# Patient Record
Sex: MALE | Race: OTHER | NOT HISPANIC OR LATINO | URBAN - METROPOLITAN AREA
[De-identification: names, ages, dates, MRNs, and addresses within clinical notes are randomized per-mention and may not be internally consistent; named-entity substitution may affect disease eponyms.]

---

## 2017-05-29 ENCOUNTER — EMERGENCY (EMERGENCY)
Facility: HOSPITAL | Age: 57
LOS: 0 days | Discharge: HOME | End: 2017-05-30

## 2017-06-28 DIAGNOSIS — R20.0 ANESTHESIA OF SKIN: ICD-10-CM

## 2017-06-28 DIAGNOSIS — G89.29 OTHER CHRONIC PAIN: ICD-10-CM

## 2017-06-28 DIAGNOSIS — E11.9 TYPE 2 DIABETES MELLITUS WITHOUT COMPLICATIONS: ICD-10-CM

## 2017-06-28 DIAGNOSIS — Z79.82 LONG TERM (CURRENT) USE OF ASPIRIN: ICD-10-CM
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
## 2017-06-28 DIAGNOSIS — R51 HEADACHE: ICD-10-CM
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
## 2017-06-28 DIAGNOSIS — M54.5 LOW BACK PAIN: ICD-10-CM

## 2017-06-28 DIAGNOSIS — S40.811A ABRASION OF RIGHT UPPER ARM, INITIAL ENCOUNTER: ICD-10-CM

## 2017-06-28 DIAGNOSIS — M54.2 CERVICALGIA: ICD-10-CM

## 2017-06-28 DIAGNOSIS — E78.00 PURE HYPERCHOLESTEROLEMIA, UNSPECIFIED: ICD-10-CM

## 2017-08-22 ENCOUNTER — OUTPATIENT (OUTPATIENT)
Dept: OUTPATIENT SERVICES | Facility: HOSPITAL | Age: 57
LOS: 1 days | Discharge: HOME | End: 2017-08-22

## 2017-08-22 DIAGNOSIS — K04.7 PERIAPICAL ABSCESS WITHOUT SINUS: ICD-10-CM

## 2017-09-05 ENCOUNTER — EMERGENCY (EMERGENCY)
Facility: HOSPITAL | Age: 57
LOS: 0 days | Discharge: HOME | End: 2017-09-05
Admitting: FAMILY MEDICINE

## 2017-09-05 DIAGNOSIS — Z79.84 LONG TERM (CURRENT) USE OF ORAL HYPOGLYCEMIC DRUGS: ICD-10-CM

## 2017-09-05 DIAGNOSIS — R07.89 OTHER CHEST PAIN: ICD-10-CM
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
## 2017-09-05 DIAGNOSIS — Z88.1 ALLERGY STATUS TO OTHER ANTIBIOTIC AGENTS STATUS: ICD-10-CM

## 2017-09-05 DIAGNOSIS — E11.9 TYPE 2 DIABETES MELLITUS WITHOUT COMPLICATIONS: ICD-10-CM

## 2017-09-05 DIAGNOSIS — E78.00 PURE HYPERCHOLESTEROLEMIA, UNSPECIFIED: ICD-10-CM

## 2017-09-05 DIAGNOSIS — E78.5 HYPERLIPIDEMIA, UNSPECIFIED: ICD-10-CM

## 2017-09-05 DIAGNOSIS — I10 ESSENTIAL (PRIMARY) HYPERTENSION: ICD-10-CM

## 2017-09-05 DIAGNOSIS — Z79.899 OTHER LONG TERM (CURRENT) DRUG THERAPY: ICD-10-CM

## 2017-09-05 DIAGNOSIS — Z79.82 LONG TERM (CURRENT) USE OF ASPIRIN: ICD-10-CM
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
## 2022-06-22 PROBLEM — Z00.00 ENCOUNTER FOR PREVENTIVE HEALTH EXAMINATION: Status: ACTIVE | Noted: 2022-06-22

## 2022-06-23 ENCOUNTER — APPOINTMENT (OUTPATIENT)
Dept: PAIN MANAGEMENT | Facility: CLINIC | Age: 62
End: 2022-06-23

## 2022-06-23 ENCOUNTER — APPOINTMENT (OUTPATIENT)
Dept: NEUROLOGY | Facility: CLINIC | Age: 62
End: 2022-06-23

## 2022-06-23 VITALS
WEIGHT: 185 LBS | DIASTOLIC BLOOD PRESSURE: 78 MMHG | HEIGHT: 71 IN | SYSTOLIC BLOOD PRESSURE: 132 MMHG | BODY MASS INDEX: 25.9 KG/M2 | HEART RATE: 75 BPM

## 2022-06-23 DIAGNOSIS — Z87.891 PERSONAL HISTORY OF NICOTINE DEPENDENCE: ICD-10-CM

## 2022-06-23 DIAGNOSIS — Z86.39 PERSONAL HISTORY OF OTHER ENDOCRINE, NUTRITIONAL AND METABOLIC DISEASE: ICD-10-CM

## 2022-06-23 DIAGNOSIS — Z78.9 OTHER SPECIFIED HEALTH STATUS: ICD-10-CM

## 2022-06-23 PROCEDURE — 99213 OFFICE O/P EST LOW 20 MIN: CPT

## 2022-06-23 NOTE — ASSESSMENT
[FreeTextEntry1] : Mr. Ramirez suffers with chronic lumbar pain with right leg radiculopathy. He denies any new issues or concerns. He continues to remain stable on a regimen of Percocet, ibuprofen, and amitriptyline which provides him with approximately 50-60% relief. He denies any adverse side effects and shows no aberrant drug behavior. His Percocet was refilled for him today. He will follow up in 4 weeks for routine care.

## 2022-06-23 NOTE — HISTORY OF PRESENT ILLNESS
[FreeTextEntry1] : HISTORY OF PRESENT ILLNESS, ORIGINAL PRESENTATION: Mr. Ramirez is a 62-year-old gentleman who presents with a 20-year history of low back pain. He was seen by Neurosurgery in 2013. They had recommended possible surgical intervention; however, at that time, he was not interested. As mentioned above, his pain radiates down the right leg to the foot with paresthesias and dysesthesias. He describes it as moderate-to-severe, nearly constant, 60% to 95% percent of the time. No particular pattern. He describes the pain as shooting, sharp, cutting with numbness in the lower extremities. He draws a corresponding pain diagram. He states that standing, sitting, walking, exercise, coughing, sneezing all increase his pain. Lying down and relaxing decrease his pain. He denies any recent change in bowel or bladder habits.\par \par ACTIVITIES: He states he can walk several blocks depending on how severe his pain is. Depending on how severe pain is, he does not know how long he could sit or stand for. In a day, he often lies down because of the pain. He does not use an assistive device to ambulate. Because of his pain, he has difficulty going to work, \par \par TODAY: He presents today for a revisit appointment. He continues to suffer with chronic back pain and right leg radiculopathy. His symptoms remain unchanged. He typically notes an increase in pain due to the cold / damp weather.\par \par He continues to be medically managed on ibuprofen twice a day PRN, Percocet 7.5/325 mg twice daily as needed as well as amitriptyline nightly. He states the combination of medications help him function during the day and sleep better at night. Overall, he states the medications provide him with approximately 60-65% relief. He denies any adverse side effects.\par \par UDS: repeated today, 6/23/22 – see separate note.

## 2022-06-23 NOTE — DISCUSSION/SUMMARY
[de-identified] : I have consulted the  registry for the purpose of reviewing the patient's controlled substance.\par \par Overall there is at least a 30-50% reduction in pain with the prescribed analgesics. The patient denies any adverse side effects due to the medication (sleeping disturbance, constipation, sleepiness, hallucinations and/or urination problems). \par \par Urine drug screening collected today with rapid sample result consistent with given regimen. Sample to be sent for confirmatory testing with additional relief at a later time.\par \par The patient will return to the office in 4 weeks time and is aware to contact me with any question or concerns in the interim.\par \par Silvia Craven PA-C\par Chapincito Drummond DO\par \par

## 2022-08-23 ENCOUNTER — APPOINTMENT (OUTPATIENT)
Dept: PAIN MANAGEMENT | Facility: CLINIC | Age: 62
End: 2022-08-23

## 2022-09-26 ENCOUNTER — LABORATORY RESULT (OUTPATIENT)
Age: 62
End: 2022-09-26

## 2022-09-27 ENCOUNTER — APPOINTMENT (OUTPATIENT)
Dept: PAIN MANAGEMENT | Facility: CLINIC | Age: 62
End: 2022-09-27

## 2022-09-27 VITALS
HEART RATE: 106 BPM | SYSTOLIC BLOOD PRESSURE: 125 MMHG | DIASTOLIC BLOOD PRESSURE: 78 MMHG | WEIGHT: 185 LBS | HEIGHT: 71 IN | BODY MASS INDEX: 25.9 KG/M2

## 2022-09-27 LAB — AMPHET UR-MCNC: NEGATIVE

## 2022-09-27 PROCEDURE — 99213 OFFICE O/P EST LOW 20 MIN: CPT

## 2022-09-27 PROCEDURE — 80305 DRUG TEST PRSMV DIR OPT OBS: CPT | Mod: QW

## 2022-09-27 NOTE — ASSESSMENT
[FreeTextEntry1] : Mr. Ramirez suffers with chronic lumbar pain with right leg radiculopathy. He denies any new issues or concerns. He continues to remain stable on a regimen of Percocet, ibuprofen, and amitriptyline which provides him with approximately 50-60% relief. He denies any adverse side effects and shows no aberrant drug behavior. His Percocet was refilled for him today. He will call for medication refills next month. Patient will follow up in 8 weeks for routine care.

## 2022-09-27 NOTE — HISTORY OF PRESENT ILLNESS
[FreeTextEntry1] : HISTORY OF PRESENT ILLNESS, ORIGINAL PRESENTATION: Mr. Ramirez is a 62-year-old gentleman who presents with a 20-year history of low back pain. He was seen by Neurosurgery in 2013. They had recommended possible surgical intervention; however, at that time, he was not interested. As mentioned above, his pain radiates down the right leg to the foot with paresthesias and dysesthesias. He describes it as moderate-to-severe, nearly constant, 60% to 95% percent of the time. No particular pattern. He describes the pain as shooting, sharp, cutting with numbness in the lower extremities. He draws a corresponding pain diagram. He states that standing, sitting, walking, exercise, coughing, sneezing all increase his pain. Lying down and relaxing decrease his pain. He denies any recent change in bowel or bladder habits.\par \par ACTIVITIES: He states he can walk several blocks depending on how severe his pain is. Depending on how severe pain is, he does not know how long he could sit or stand for. In a day, he often lies down because of the pain. He does not use an assistive device to ambulate. Because of his pain, he has difficulty going to work, \par \par TODAY: He presents today for a revisit appointment. He continues to suffer with chronic back pain and right leg radiculopathy. His symptoms remain unchanged. He typically notes an increase in pain due to the cold / damp weather.\par \par He continues to be medically managed on ibuprofen twice a day PRN, Percocet 7.5/325 mg twice daily as needed as well as amitriptyline nightly. He states the combination of medications help him function during the day and sleep better at night. Overall, he states the medications provide him with approximately 60-65% relief. He denies any adverse side effects.\par \par UDS, 06/2022 - Consistent\par Repeated today, 9/27/22 - see separate note.

## 2022-09-27 NOTE — DISCUSSION/SUMMARY
[de-identified] : I have consulted the  registry for the purpose of reviewing the patient's controlled substance.\par \par Overall there is at least a 30-50% reduction in pain with the prescribed analgesics. The patient denies any adverse side effects due to the medication (sleeping disturbance, constipation, sleepiness, hallucinations and/or urination problems). \par \par Urine drug screening collected today with rapid sample result consistent with given regimen. Sample to be sent for confirmatory testing with additional relief at a later time.\par \par The patient will return to the office in 4 weeks time and is aware to contact me with any question or concerns in the interim.\par \par Silvia Craven PA-C\par Chapincito Drummond DO\par \par

## 2022-11-01 ENCOUNTER — APPOINTMENT (OUTPATIENT)
Dept: PAIN MANAGEMENT | Facility: CLINIC | Age: 62
End: 2022-11-01

## 2022-11-03 ENCOUNTER — APPOINTMENT (OUTPATIENT)
Dept: PAIN MANAGEMENT | Facility: CLINIC | Age: 62
End: 2022-11-03

## 2022-12-02 ENCOUNTER — APPOINTMENT (OUTPATIENT)
Dept: PAIN MANAGEMENT | Facility: CLINIC | Age: 62
End: 2022-12-02

## 2022-12-02 VITALS
BODY MASS INDEX: 25.9 KG/M2 | SYSTOLIC BLOOD PRESSURE: 123 MMHG | HEIGHT: 71 IN | HEART RATE: 79 BPM | DIASTOLIC BLOOD PRESSURE: 80 MMHG | WEIGHT: 185 LBS

## 2022-12-02 PROCEDURE — 99213 OFFICE O/P EST LOW 20 MIN: CPT

## 2022-12-02 NOTE — HISTORY OF PRESENT ILLNESS
[FreeTextEntry1] : HISTORY OF PRESENT ILLNESS, ORIGINAL PRESENTATION: Mr. Ramirez is a 62-year-old gentleman who presents with a 20-year history of low back pain. He was seen by Neurosurgery in 2013. They had recommended possible surgical intervention; however, at that time, he was not interested. As mentioned above, his pain radiates down the right leg to the foot with paresthesias and dysesthesias. He describes it as moderate-to-severe, nearly constant, 60% to 95% percent of the time. No particular pattern. He describes the pain as shooting, sharp, cutting with numbness in the lower extremities. He draws a corresponding pain diagram. He states that standing, sitting, walking, exercise, coughing, sneezing all increase his pain. Lying down and relaxing decrease his pain. He denies any recent change in bowel or bladder habits.\par \par ACTIVITIES: He states he can walk several blocks depending on how severe his pain is. Depending on how severe pain is, he does not know how long he could sit or stand for. In a day, he often lies down because of the pain. He does not use an assistive device to ambulate. Because of his pain, he has difficulty going to work, \par \par TODAY: I had the pleasure of seeing Mr. Ramirez today in follow up.  His previous history and physical findings have been reviewed.\par \par He is under our care for chronic lumbar pain with radiculopathy which he is receiving continuing active treatment for.  He states nothing has changed over the past several weeks with respect to his condition.  He remains stable on a regimen of ibuprofen twice a day PRN, Percocet 7.5/325 mg twice daily as needed as well as amitriptyline nightly. He states the combination of medications help him function during the day and sleep better at night. He states the cold weather increases his pain but overall states the medications provide him with approximately 60-65% relief without side effects.  Previous UDS testing was consistent and we will therefore continue as is without change.\par

## 2022-12-02 NOTE — ASSESSMENT
[FreeTextEntry1] : 62 year old male with chronic lumbar pain with radiculopathy.  We will continue to prescribe the above medication as mentioned and f/u in 4-8 weeks for re evaluation.  He is aware if there are any issues he will contact the office.\par \par I personally reviewed with the PA, this patient's history and physical exam findings, as documented above. I have discussed the relevant areas of concern, having direct implications to the presenting problems and illnesses, and I have personally examined all pertinent and positive and negative findings, which impact on the prior pain management treatment. \par \par \par Check of the  registry reveals compliance in regards to narcotic medication management use\par \par \par Kaitlin Reynolds, MS, PA-C\par Chapincito Drummond, \par

## 2022-12-06 ENCOUNTER — NON-APPOINTMENT (OUTPATIENT)
Age: 62
End: 2022-12-06

## 2023-01-12 ENCOUNTER — LABORATORY RESULT (OUTPATIENT)
Age: 63
End: 2023-01-12

## 2023-01-12 ENCOUNTER — APPOINTMENT (OUTPATIENT)
Dept: PAIN MANAGEMENT | Facility: CLINIC | Age: 63
End: 2023-01-12
Payer: MEDICARE

## 2023-01-12 VITALS
HEART RATE: 82 BPM | SYSTOLIC BLOOD PRESSURE: 127 MMHG | HEIGHT: 71 IN | WEIGHT: 185 LBS | DIASTOLIC BLOOD PRESSURE: 76 MMHG | BODY MASS INDEX: 25.9 KG/M2

## 2023-01-12 LAB — PM MDA: NORMAL

## 2023-01-12 PROCEDURE — 99213 OFFICE O/P EST LOW 20 MIN: CPT

## 2023-01-12 PROCEDURE — 80305 DRUG TEST PRSMV DIR OPT OBS: CPT | Mod: QW

## 2023-01-12 NOTE — ASSESSMENT
[FreeTextEntry1] : 62 year old male with chronic lumbar pain with radiculopathy. His symptoms remain stable. He will continue with his medication regimen as is without change at this time. He will f/u in 4 weeks for reevaluation. He is aware if there are any issues he will contact the office.\par

## 2023-01-12 NOTE — DISCUSSION/SUMMARY
[de-identified] : I have consulted the  registry for the purpose of reviewing the patient's controlled substance.\par \par Overall there is at least a 30-50% reduction in pain with the prescribed analgesics. The patient denies any adverse side effects due to the medication (sleeping disturbance, constipation, sleepiness, hallucinations and/or urination problems). \par \par Urine drug screening collected today with rapid sample result consistent with given regimen. Sample to be sent for confirmatory testing with additional relief at a later time.\par \par The patient will return to the office in 4 weeks time and is aware to contact me with any question or concerns in the interim.\par \par Silvia Craven PA-C\par Chapincito Drummond DO\par

## 2023-01-12 NOTE — HISTORY OF PRESENT ILLNESS
[FreeTextEntry1] : HISTORY OF PRESENT ILLNESS, ORIGINAL PRESENTATION: Mr. Ramirez is a 62-year-old gentleman who presents with a 20-year history of low back pain. He was seen by Neurosurgery in 2013. They had recommended possible surgical intervention; however, at that time, he was not interested. As mentioned above, his pain radiates down the right leg to the foot with paresthesias and dysesthesias. He describes it as moderate-to-severe, nearly constant, 60% to 95% percent of the time. No particular pattern. He describes the pain as shooting, sharp, cutting with numbness in the lower extremities. He draws a corresponding pain diagram. He states that standing, sitting, walking, exercise, coughing, sneezing all increase his pain. Lying down and relaxing decrease his pain. He denies any recent change in bowel or bladder habits.\par \par ACTIVITIES: He states he can walk several blocks depending on how severe his pain is. Depending on how severe pain is, he does not know how long he could sit or stand for. In a day, he often lies down because of the pain. He does not use an assistive device to ambulate. Because of his pain, he has difficulty going to work, \par \par TODAY: I had the pleasure of seeing Mr. Ramirez today in follow up.  His previous history and physical findings have been reviewed.\par \par He is under our care for chronic lumbar pain with radiculopathy which he is receiving continuing active treatment for. His symptoms remain unchanged. He remains stable on a regimen of ibuprofen twice a day PRN, Percocet 7.5/325 mg twice daily as needed as well as amitriptyline nightly. He states the combination of medications help him function during the day and sleep better at night. He states the cold weather increases his pain but overall states the medications provide him with approximately 60-65% relief without side effects. \par \par UDS: repeated today, 1/12/23 - see separate note.\par SOAPP, done today, 1/12/23 - HIGH  RISK.\par \par

## 2023-02-02 ENCOUNTER — APPOINTMENT (OUTPATIENT)
Dept: PAIN MANAGEMENT | Facility: CLINIC | Age: 63
End: 2023-02-02
Payer: MEDICARE

## 2023-02-02 VITALS
HEIGHT: 71 IN | SYSTOLIC BLOOD PRESSURE: 117 MMHG | BODY MASS INDEX: 25.9 KG/M2 | WEIGHT: 185 LBS | DIASTOLIC BLOOD PRESSURE: 76 MMHG | HEART RATE: 74 BPM

## 2023-02-02 PROCEDURE — 99213 OFFICE O/P EST LOW 20 MIN: CPT

## 2023-02-02 NOTE — HISTORY OF PRESENT ILLNESS
[FreeTextEntry1] : HISTORY OF PRESENT ILLNESS, ORIGINAL PRESENTATION: Mr. Ramirez is a 62-year-old gentleman who presents with a 20-year history of low back pain. He was seen by Neurosurgery in 2013. They had recommended possible surgical intervention; however, at that time, he was not interested. As mentioned above, his pain radiates down the right leg to the foot with paresthesias and dysesthesias. He describes it as moderate-to-severe, nearly constant, 60% to 95% percent of the time. No particular pattern. He describes the pain as shooting, sharp, cutting with numbness in the lower extremities. He draws a corresponding pain diagram. He states that standing, sitting, walking, exercise, coughing, sneezing all increase his pain. Lying down and relaxing decrease his pain. He denies any recent change in bowel or bladder habits.\par \par ACTIVITIES: He states he can walk several blocks depending on how severe his pain is. Depending on how severe pain is, he does not know how long he could sit or stand for. In a day, he often lies down because of the pain. He does not use an assistive device to ambulate. Because of his pain, he has difficulty going to work, \par \par TODAY: I had the pleasure of seeing Mr. Ramirez today in follow up.  His previous history and physical findings have been reviewed.\par \par He is under our care for chronic lumbar pain with radiculopathy which he is receiving continuing active treatment for. He reports his right leg pain has been bothersome for him. He has trialed PT and injections in the past, which were ineffective for him. He does not wish to re-trial them at this time.\par \par He remains stable on a regimen of ibuprofen twice a day PRN, Percocet 7.5/325 mg twice daily as needed as well as amitriptyline nightly. He states the combination of medications help him function during the day and sleep better at night. He denies side effects.  We discussed increasing his amitriptyline to 2 tabs at nighttime, which he is agreeable with.\par \par UDS: 1/12/23.\par SOAPP 1/12/23 - HIGH  RISK.\par \par

## 2023-02-02 NOTE — DISCUSSION/SUMMARY
[de-identified] : I have consulted the  registry for the purpose of reviewing the patient's controlled substance.\par \par Overall there is at least a 30-50% reduction in pain with the prescribed analgesics. The patient denies any adverse side effects due to the medication (sleeping disturbance, constipation, sleepiness, hallucinations and/or urination problems). \par \par The patient will return to the office in 4 weeks time and is aware to contact me with any question or concerns in the interim.\par \par Silvia Craven PA-C\par Chapincito Drummond DO\par

## 2023-02-02 NOTE — ASSESSMENT
[FreeTextEntry1] : 62 year old male with chronic lumbar pain with radiculopathy. He will increase his amitriptyline to 2 tabs at nighttime. He will f/u in 4 weeks for reevaluation. He is aware if there are any issues he will contact the office.\par

## 2023-02-27 ENCOUNTER — EMERGENCY (EMERGENCY)
Facility: HOSPITAL | Age: 63
LOS: 0 days | Discharge: HOME | End: 2023-02-27
Attending: EMERGENCY MEDICINE
Payer: MEDICARE

## 2023-02-27 VITALS
DIASTOLIC BLOOD PRESSURE: 71 MMHG | OXYGEN SATURATION: 100 % | RESPIRATION RATE: 19 BRPM | TEMPERATURE: 98 F | SYSTOLIC BLOOD PRESSURE: 129 MMHG | HEART RATE: 88 BPM

## 2023-02-27 VITALS
DIASTOLIC BLOOD PRESSURE: 77 MMHG | OXYGEN SATURATION: 99 % | HEART RATE: 90 BPM | SYSTOLIC BLOOD PRESSURE: 145 MMHG | RESPIRATION RATE: 18 BRPM | WEIGHT: 149.91 LBS | TEMPERATURE: 98 F

## 2023-02-27 DIAGNOSIS — Z88.1 ALLERGY STATUS TO OTHER ANTIBIOTIC AGENTS STATUS: ICD-10-CM

## 2023-02-27 DIAGNOSIS — Z29.14 ENCOUNTER FOR PROPHYLACTIC RABIES IMMUNE GLOBIN: ICD-10-CM
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
## 2023-02-27 DIAGNOSIS — T38.3X6A UNDERDOSING OF INSULIN AND ORAL HYPOGLYCEMIC [ANTIDIABETIC] DRUGS, INITIAL ENCOUNTER: ICD-10-CM

## 2023-02-27 DIAGNOSIS — Z79.84 LONG TERM (CURRENT) USE OF ORAL HYPOGLYCEMIC DRUGS: ICD-10-CM
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
## 2023-02-27 DIAGNOSIS — R42 DIZZINESS AND GIDDINESS: ICD-10-CM

## 2023-02-27 DIAGNOSIS — E11.65 TYPE 2 DIABETES MELLITUS WITH HYPERGLYCEMIA: ICD-10-CM

## 2023-02-27 LAB
ALBUMIN SERPL ELPH-MCNC: 4.1 G/DL — SIGNIFICANT CHANGE UP (ref 3.5–5.2)
ALP SERPL-CCNC: 91 U/L — SIGNIFICANT CHANGE UP (ref 30–115)
ALT FLD-CCNC: 24 U/L — SIGNIFICANT CHANGE UP (ref 0–41)
ANION GAP SERPL CALC-SCNC: 12 MMOL/L — SIGNIFICANT CHANGE UP (ref 7–14)
APPEARANCE UR: CLEAR — SIGNIFICANT CHANGE UP
AST SERPL-CCNC: 17 U/L — SIGNIFICANT CHANGE UP (ref 0–41)
B-OH-BUTYR SERPL-SCNC: <0.2 MMOL/L — SIGNIFICANT CHANGE UP
BASE EXCESS BLDV CALC-SCNC: 4.6 MMOL/L — HIGH (ref -2–3)
BASOPHILS # BLD AUTO: 0.13 K/UL — SIGNIFICANT CHANGE UP (ref 0–0.2)
BASOPHILS NFR BLD AUTO: 1.7 % — HIGH (ref 0–1)
BILIRUB SERPL-MCNC: 0.3 MG/DL — SIGNIFICANT CHANGE UP (ref 0.2–1.2)
BILIRUB UR-MCNC: NEGATIVE — SIGNIFICANT CHANGE UP
BUN SERPL-MCNC: 18 MG/DL — SIGNIFICANT CHANGE UP (ref 10–20)
CA-I SERPL-SCNC: 1.22 MMOL/L — SIGNIFICANT CHANGE UP (ref 1.15–1.33)
CALCIUM SERPL-MCNC: 9.7 MG/DL — SIGNIFICANT CHANGE UP (ref 8.4–10.5)
CHLORIDE SERPL-SCNC: 97 MMOL/L — LOW (ref 98–110)
CO2 SERPL-SCNC: 25 MMOL/L — SIGNIFICANT CHANGE UP (ref 17–32)
COLOR SPEC: YELLOW — SIGNIFICANT CHANGE UP
CREAT SERPL-MCNC: 0.7 MG/DL — SIGNIFICANT CHANGE UP (ref 0.7–1.5)
DIFF PNL FLD: NEGATIVE — SIGNIFICANT CHANGE UP
EGFR: 104 ML/MIN/1.73M2 — SIGNIFICANT CHANGE UP
EOSINOPHIL # BLD AUTO: 0.34 K/UL — SIGNIFICANT CHANGE UP (ref 0–0.7)
EOSINOPHIL NFR BLD AUTO: 4.6 % — SIGNIFICANT CHANGE UP (ref 0–8)
GAS PNL BLDV: 133 MMOL/L — LOW (ref 136–145)
GAS PNL BLDV: SIGNIFICANT CHANGE UP
GLUCOSE SERPL-MCNC: 157 MG/DL — HIGH (ref 70–99)
GLUCOSE UR QL: 250 MG/DL
HCO3 BLDV-SCNC: 29 MMOL/L — SIGNIFICANT CHANGE UP (ref 22–29)
HCT VFR BLD CALC: 47 % — SIGNIFICANT CHANGE UP (ref 42–52)
HCT VFR BLDA CALC: 47 % — SIGNIFICANT CHANGE UP (ref 39–51)
HGB BLD CALC-MCNC: 15.8 G/DL — SIGNIFICANT CHANGE UP (ref 12.6–17.4)
HGB BLD-MCNC: 16 G/DL — SIGNIFICANT CHANGE UP (ref 14–18)
IMM GRANULOCYTES NFR BLD AUTO: 0.5 % — HIGH (ref 0.1–0.3)
KETONES UR-MCNC: NEGATIVE — SIGNIFICANT CHANGE UP
LACTATE BLDV-MCNC: 1.4 MMOL/L — SIGNIFICANT CHANGE UP (ref 0.5–2)
LEUKOCYTE ESTERASE UR-ACNC: NEGATIVE — SIGNIFICANT CHANGE UP
LYMPHOCYTES # BLD AUTO: 3.19 K/UL — SIGNIFICANT CHANGE UP (ref 1.2–3.4)
LYMPHOCYTES # BLD AUTO: 42.9 % — SIGNIFICANT CHANGE UP (ref 20.5–51.1)
MCHC RBC-ENTMCNC: 29.2 PG — SIGNIFICANT CHANGE UP (ref 27–31)
MCHC RBC-ENTMCNC: 34 G/DL — SIGNIFICANT CHANGE UP (ref 32–37)
MCV RBC AUTO: 85.8 FL — SIGNIFICANT CHANGE UP (ref 80–94)
MONOCYTES # BLD AUTO: 0.51 K/UL — SIGNIFICANT CHANGE UP (ref 0.1–0.6)
MONOCYTES NFR BLD AUTO: 6.9 % — SIGNIFICANT CHANGE UP (ref 1.7–9.3)
NEUTROPHILS # BLD AUTO: 3.22 K/UL — SIGNIFICANT CHANGE UP (ref 1.4–6.5)
NEUTROPHILS NFR BLD AUTO: 43.4 % — SIGNIFICANT CHANGE UP (ref 42.2–75.2)
NITRITE UR-MCNC: NEGATIVE — SIGNIFICANT CHANGE UP
NRBC # BLD: 0 /100 WBCS — SIGNIFICANT CHANGE UP (ref 0–0)
PCO2 BLDV: 42 MMHG — SIGNIFICANT CHANGE UP (ref 42–55)
PH BLDV: 7.45 — HIGH (ref 7.32–7.43)
PH UR: 7 — SIGNIFICANT CHANGE UP (ref 5–8)
PLATELET # BLD AUTO: 276 K/UL — SIGNIFICANT CHANGE UP (ref 130–400)
PO2 BLDV: 43 MMHG — SIGNIFICANT CHANGE UP
POTASSIUM BLDV-SCNC: 3.8 MMOL/L — SIGNIFICANT CHANGE UP (ref 3.5–5.1)
POTASSIUM SERPL-MCNC: 4.1 MMOL/L — SIGNIFICANT CHANGE UP (ref 3.5–5)
POTASSIUM SERPL-SCNC: 4.1 MMOL/L — SIGNIFICANT CHANGE UP (ref 3.5–5)
PROT SERPL-MCNC: 6.8 G/DL — SIGNIFICANT CHANGE UP (ref 6–8)
PROT UR-MCNC: NEGATIVE MG/DL — SIGNIFICANT CHANGE UP
RBC # BLD: 5.48 M/UL — SIGNIFICANT CHANGE UP (ref 4.7–6.1)
RBC # FLD: 12.2 % — SIGNIFICANT CHANGE UP (ref 11.5–14.5)
SAO2 % BLDV: 78.6 % — SIGNIFICANT CHANGE UP
SODIUM SERPL-SCNC: 134 MMOL/L — LOW (ref 135–146)
SP GR SPEC: 1.02 — SIGNIFICANT CHANGE UP (ref 1.01–1.03)
UROBILINOGEN FLD QL: 0.2 MG/DL — SIGNIFICANT CHANGE UP
WBC # BLD: 7.43 K/UL — SIGNIFICANT CHANGE UP (ref 4.8–10.8)
WBC # FLD AUTO: 7.43 K/UL — SIGNIFICANT CHANGE UP (ref 4.8–10.8)

## 2023-02-27 PROCEDURE — 99283 EMERGENCY DEPT VISIT LOW MDM: CPT

## 2023-02-27 PROCEDURE — 85014 HEMATOCRIT: CPT

## 2023-02-27 PROCEDURE — 85025 COMPLETE CBC W/AUTO DIFF WBC: CPT

## 2023-02-27 PROCEDURE — 82962 GLUCOSE BLOOD TEST: CPT

## 2023-02-27 PROCEDURE — 82803 BLOOD GASES ANY COMBINATION: CPT

## 2023-02-27 PROCEDURE — 80053 COMPREHEN METABOLIC PANEL: CPT

## 2023-02-27 PROCEDURE — 85018 HEMOGLOBIN: CPT

## 2023-02-27 PROCEDURE — 82330 ASSAY OF CALCIUM: CPT

## 2023-02-27 PROCEDURE — 81003 URINALYSIS AUTO W/O SCOPE: CPT

## 2023-02-27 PROCEDURE — 84295 ASSAY OF SERUM SODIUM: CPT

## 2023-02-27 PROCEDURE — 87086 URINE CULTURE/COLONY COUNT: CPT

## 2023-02-27 PROCEDURE — 83605 ASSAY OF LACTIC ACID: CPT

## 2023-02-27 PROCEDURE — 82010 KETONE BODYS QUAN: CPT

## 2023-02-27 PROCEDURE — 36415 COLL VENOUS BLD VENIPUNCTURE: CPT

## 2023-02-27 PROCEDURE — 84132 ASSAY OF SERUM POTASSIUM: CPT

## 2023-02-27 PROCEDURE — 99284 EMERGENCY DEPT VISIT MOD MDM: CPT

## 2023-02-27 RX ORDER — SODIUM CHLORIDE 9 MG/ML
2000 INJECTION, SOLUTION INTRAVENOUS ONCE
Refills: 0 | Status: COMPLETED | OUTPATIENT
Start: 2023-02-27 | End: 2023-02-27

## 2023-02-27 RX ORDER — METFORMIN HYDROCHLORIDE 850 MG/1
0 TABLET ORAL
Qty: 0 | Refills: 0 | DISCHARGE

## 2023-02-27 RX ADMIN — SODIUM CHLORIDE 2000 MILLILITER(S): 9 INJECTION, SOLUTION INTRAVENOUS at 17:09

## 2023-02-27 NOTE — ED ADULT NURSE REASSESSMENT NOTE - NS ED NURSE REASSESS COMMENT FT1
Pt verbalize understanding of d/c instruction and follow up with PCP. Continued compliance with home medications. Leaving ED in no distress ambulating with steady gait.

## 2023-02-27 NOTE — ED PROVIDER NOTE - NSFOLLOWUPINSTRUCTIONS_ED_ALL_ED_FT
Please follow up with your PCP at your earliest convenience    Hyperglycemia  Hyperglycemia occurs when the level of sugar (glucose) in the blood is too high. Glucose is a type of sugar that provides the body's main source of energy. Certain hormones (insulin and glucagon) control the level of glucose in the blood. Insulin lowers blood glucose, and glucagon increases blood glucose. Hyperglycemia can result from having too little insulin in the bloodstream, or from the body not responding normally to insulin.    Hyperglycemia occurs most often in people who have diabetes (diabetes mellitus), but it can happen in people who do not have diabetes. It can develop quickly, and it can be life-threatening if it causes you to become severely dehydrated (diabetic ketoacidosis or hyperglycemic hyperosmolar state). Severe hyperglycemia is a medical emergency.    What are the causes?  If you have diabetes, hyperglycemia may be caused by:  Diabetes medicine.Medicines that increase blood glucose or affect your diabetes control.Not eating enough, or not eating often enough.Changes in physical activity level.Being sick or having an infection.If you have prediabetes or undiagnosed diabetes:  Hyperglycemia may be caused by those conditions.If you do not have diabetes, hyperglycemia may be caused by:  Certain medicines, including steroid medicines, beta-blockers, epinephrine, and thiazide diuretics.  Stress.  Serious illness.  Surgery.  Diseases of the pancreas.  Infection.    What increases the risk?  Hyperglycemia is more likely to develop in people who have risk factors for diabetes, such as:  Having a family member with diabetes.Having a gene for type 1 diabetes that is passed from parent to child (inherited).Living in an area with cold weather conditions.Exposure to certain viruses.Certain conditions in which the body's disease-fighting (immune) system attacks itself (autoimmune disorders).Being overweight or obese.Having an inactive (sedentary) lifestyle.Having been diagnosed with insulin resistance.Having a history of prediabetes, gestational diabetes, or polycystic ovarian syndrome (PCOS).Being of American-G[place], -American, /, or / descent.    What are the signs or symptoms?  Hyperglycemia may not cause any symptoms. If you do have symptoms, they may include early warning signs, such as:  Increased thirst.Hunger.Feeling very tired.Needing to urinate more often than usual.Blurry vision.    Other symptoms may develop if hyperglycemia gets worse, such as:  Dry mouth.Loss of appetite.Fruity-smelling breath.Weakness.Unexpected or rapid weight gain or weight loss.Tingling or numbness in the hands or feet.Headache.Skin that does not quickly return to normal after being lightly pinched and released (poor skin turgor).Abdominal pain.Cuts or bruises that are slow to heal.How is this diagnosed?    Hyperglycemia is diagnosed with a blood test to measure your blood glucose level. This blood test is usually done while you are having symptoms. Your health care provider may also do a physical exam and review your medical history.  You may have more tests to determine the cause of your hyperglycemia, such as:  A fasting blood glucose (FBG) test. You will not be allowed to eat (you will fast) for at least 8 hours before a blood sample is taken.An A1c (hemoglobin A1c) blood test. This provides information about blood glucose control over the previous 2–3 months.An oral glucose tolerance test (OGTT). This measures your blood glucose at two times:  After fasting. This is your baseline blood glucose level.Two hours after drinking a beverage that contains glucose.    How is this treated?  Treatment depends on the cause of your hyperglycemia.     Treatment may include:  Taking medicine to regulate your blood glucose levels. If you take insulin or other diabetes medicines, your medicine or dosage may be adjusted.Lifestyle changes, such as exercising more, eating healthier foods, or losing weight.Treating an illness or infection, if this caused your hyperglycemia.Checking your blood glucose more often.Stopping or reducing steroid medicines, if these caused your hyperglycemia.If your hyperglycemia becomes severe and it results in hyperglycemic hyperosmolar state, you must be hospitalized and given IV fluids.  Follow these instructions at home:     General instructions     Take over-the-counter and prescription medicines only as told by your health care provider.Do not use any products that contain nicotine or tobacco, such as cigarettes and e-cigarettes. If you need help quitting, ask your health care provider.Limit alcohol intake to no more than 1 drink per day for nonpregnant women and 2 drinks per day for men. One drink equals 12 oz of beer, 5 oz of wine, or 1½ oz of hard liquor.Learn to manage stress. If you need help with this, ask your health care provider.Keep all follow-up visits as told by your health care provider. This is important.Eating and drinking        Maintain a healthy weight.Exercise regularly, as directed by your health care provider.Stay hydrated, especially when you exercise, get sick, or spend time in hot temperatures.Eat healthy foods, such as:  Lean proteins.Complex carbohydrates.Fresh fruits and vegetables.Low-fat dairy products.Healthy fats.Drink enough fluid to keep your urine clear or pale yellow.If you have diabetes:     Make sure you know the symptoms of hyperglycemia.Follow your diabetes management plan, as told by your health care provider. Make sure you:  Take your insulin and medicines as directed.Follow your exercise plan.Follow your meal plan. Eat on time, and do not skip meals.Check your blood glucose as often as directed. Make sure to check your blood glucose before and after exercise. If you exercise longer or in a different way than usual, check your blood glucose more often.Follow your sick day plan whenever you cannot eat or drink normally. Make this plan in advance with your health care provider.Share your diabetes management plan with people in your workplace, school, and household.Check your urine for ketones when you are ill and as told by your health care provider.Carry a medical alert card or wear medical alert jewelry.    Contact a health care provider if:  Your blood glucose is at or above 240 mg/dL (13.3 mmol/L) for 2 days in a row.You have problems keeping your blood glucose in your target range.You have frequent episodes of hyperglycemia.    Get help right away if:  You have difficulty breathing.You have a change in how you think, feel, or act (mental status).You have nausea or vomiting that does not go away.These symptoms may represent a serious problem that is an emergency. Do not wait to see if the symptoms will go away. Get medical help right away. Call your local emergency services (911 in the [place] Do not drive yourself to the hospital.     Summary  Hyperglycemia occurs when the level of sugar (glucose) in the blood is too high.Hyperglycemia is diagnosed with a blood test to measure your blood glucose level. This blood test is usually done while you are having symptoms. Your health care provider may also do a physical exam and review your medical history.If you have diabetes, follow your diabetes management plan as told by your health care provider.Contact your health care provider if you have problems keeping your blood glucose in your target range.This information is not intended to replace advice given to you by your health care provider. Make sure you discuss any questions you have with your health care provider.

## 2023-02-27 NOTE — ED PROVIDER NOTE - OBJECTIVE STATEMENT
62-year-old male with past history of diabetes noncompliant with medications for the past 3 years recently started taking metformin 1 day prior presents with elevated fingersticks for the past 3 days per daughter patient daughter states that fingersticks have been close to 500 for the past 3 days.  Patient states that he has had symptoms of increased urinary frequency headache weakness nausea.  Patient denies any chest pain shortness of breath.

## 2023-02-27 NOTE — ED PROVIDER NOTE - PATIENT PORTAL LINK FT
You can access the FollowMyHealth Patient Portal offered by [facility] by registering at the following website: [email or web address]. By joining [facility]’s FollowMyHealth portal, you will also be able to view your health information using other applications (apps) compatible with our system.

## 2023-02-27 NOTE — ED PROVIDER NOTE - PROGRESS NOTE DETAILS
kondrat- Fingerstick decreased to 157 after fluids.  Patient encouraged to follow-up with primary care physician outpatient.

## 2023-02-27 NOTE — ED PROVIDER NOTE - ATTENDING CONTRIBUTION TO CARE
62-year-old male past medical history of hypertension, hyperlipidemia, diabetes noncompliant with medications presents with few days of dizziness, nausea and increased urination.  Patient reports that he started taking his metformin a few days ago.  But reports that over the last week he has been had multiple readings with fingersticks over 500.  Reports during those episodes he gets lightheaded.  Feels nauseous.  No noted increased urination.  No burning or pain on urination.  No fevers.  No abdominal pain.  No chest pain or shortness of breath.    CONSTITUTIONAL: Well-developed; well-nourished; in no acute distress.   SKIN: warm, dry  HEAD: Normocephalic; atraumatic.  EYES: PERRL, EOMI, no conjunctival erythema  ENT: No nasal discharge; airway clear.  NECK: Supple; non tender.  CARD: S1, S2 normal;  Regular rate and rhythm.   RESP: No wheezes, rales or rhonchi.  ABD: soft non tender, non distended, no rebound or guarding  EXT: Normal ROM.  5/5 strength in all 4 extremities   LYMPH: No acute cervical adenopathy.  NEURO: Alert, oriented, grossly unremarkable. neurovascularly intact  PSYCH: Cooperative, appropriate.

## 2023-02-27 NOTE — ED PROVIDER NOTE - NS ED ROS FT
Constitutional:  No fevers or chills.  Eyes:  No visual changes, eye pain, or discharge.  ENT:  No hearing changes. No sore throat.  Neck:  No neck pain or stiffness.  Cardiac:  No CP or edema.  Resp:  No cough or SOB. No hemoptysis.   GI:  (+) nausea, no vomiting, diarrhea, or abdominal pain.  :  No dysuria, frequency, or hematuria.  MSK:  No myalgias or joint pain/swelling.  Neuro: (+) headache, no dizziness, (+) weakness.  Skin:  No skin rash.

## 2023-02-27 NOTE — ED PROVIDER NOTE - CLINICAL SUMMARY MEDICAL DECISION MAKING FREE TEXT BOX
Patient presents with hyperglycemia.  Labs UA, VBG done.  No signs of DKA.  Sugar level improved.  Discharged with PMD follow-up and return precautions.

## 2023-02-27 NOTE — ED PROVIDER NOTE - NSFOLLOWUPCLINICS_GEN_ALL_ED_FT
[facility]  Medicine  [address], [place]   Phone: [phone]  Fax:   Follow Up Time: Urgent

## 2023-03-01 LAB
CULTURE RESULTS: SIGNIFICANT CHANGE UP
SPECIMEN SOURCE: SIGNIFICANT CHANGE UP

## 2023-03-02 ENCOUNTER — APPOINTMENT (OUTPATIENT)
Dept: PAIN MANAGEMENT | Facility: CLINIC | Age: 63
End: 2023-03-02
Payer: MEDICARE

## 2023-03-02 VITALS
HEART RATE: 90 BPM | BODY MASS INDEX: 25.9 KG/M2 | WEIGHT: 185 LBS | SYSTOLIC BLOOD PRESSURE: 98 MMHG | HEIGHT: 71 IN | DIASTOLIC BLOOD PRESSURE: 62 MMHG

## 2023-03-02 VITALS — BODY MASS INDEX: 25.9 KG/M2 | HEIGHT: 71 IN | WEIGHT: 185 LBS

## 2023-03-02 PROCEDURE — 99213 OFFICE O/P EST LOW 20 MIN: CPT

## 2023-03-02 NOTE — ASSESSMENT
[FreeTextEntry1] : 62 year old male with chronic lumbar pain with radiculopathy. I have advised him to increase his amitriptyline to 2 tabs at nighttime if need be. He will continue with Percocet. I have refilled ibuprofen. He will f/u in 4 weeks for reevaluation. He is aware if there are any issues he will contact the office.\par

## 2023-03-02 NOTE — DISCUSSION/SUMMARY
[de-identified] : I have consulted the  registry for the purpose of reviewing the patient's controlled substance.\par \par Overall there is at least a 30-50% reduction in pain with the prescribed analgesics. The patient denies any adverse side effects due to the medication (sleeping disturbance, constipation, sleepiness, hallucinations and/or urination problems). \par The patient will return to the office in 4 weeks time and is aware to contact me with any question or concerns in the interim.\par \par Silvia Craven PA-C\par Chapincito Drummond DO\par

## 2023-03-02 NOTE — HISTORY OF PRESENT ILLNESS
[FreeTextEntry1] : HISTORY OF PRESENT ILLNESS, ORIGINAL PRESENTATION: Mr. Ramirez is a 62-year-old gentleman who presents with a 20-year history of low back pain. He was seen by Neurosurgery in 2013. They had recommended possible surgical intervention; however, at that time, he was not interested. As mentioned above, his pain radiates down the right leg to the foot with paresthesias and dysesthesias. He describes it as moderate-to-severe, nearly constant, 60% to 95% percent of the time. No particular pattern. He describes the pain as shooting, sharp, cutting with numbness in the lower extremities. He draws a corresponding pain diagram. He states that standing, sitting, walking, exercise, coughing, sneezing all increase his pain. Lying down and relaxing decrease his pain. He denies any recent change in bowel or bladder habits.\par \par ACTIVITIES: He states he can walk several blocks depending on how severe his pain is. Depending on how severe pain is, he does not know how long he could sit or stand for. In a day, he often lies down because of the pain. He does not use an assistive device to ambulate. Because of his pain, he has difficulty going to work, \par \par TODAY: I had the pleasure of seeing Mr. Ramirez today in follow up.  His previous history and physical findings have been reviewed.\par \par He is under our care for chronic lumbar pain with radiculopathy which he is receiving continuing active treatment for. He reports his right leg pain has been bothersome for him. He has trialed PT and injections in the past, which were ineffective for him. He does not wish to re-trial them at this time.\par \par He remains stable on a regimen of ibuprofen twice a day PRN, Percocet 7.5/325 mg twice daily as needed as well as amitriptyline nightly. He was advised to increase his amitriptyline to 2 tabs at nighttime, which he did not do. He states the combination of medications help him function during the day and sleep better at night. He denies side effects.  \par \par Of note, patient's glucose elevated to above 500 recently and he was taken to the ER where IV fluids were given to him.\par \par UDS: 1/12/23.\par SOAPP 1/12/23 - HIGH  RISK.\par \par

## 2023-03-20 PROBLEM — M54.9 DORSALGIA, UNSPECIFIED: Chronic | Status: ACTIVE | Noted: 2023-02-27

## 2023-03-20 PROBLEM — E11.9 TYPE 2 DIABETES MELLITUS WITHOUT COMPLICATIONS: Chronic | Status: ACTIVE | Noted: 2023-02-27

## 2023-04-04 ENCOUNTER — APPOINTMENT (OUTPATIENT)
Dept: PAIN MANAGEMENT | Facility: CLINIC | Age: 63
End: 2023-04-04
Payer: MEDICARE

## 2023-04-04 VITALS — WEIGHT: 185 LBS | HEIGHT: 71 IN | BODY MASS INDEX: 25.9 KG/M2

## 2023-04-04 PROCEDURE — 99213 OFFICE O/P EST LOW 20 MIN: CPT

## 2023-04-04 NOTE — HISTORY OF PRESENT ILLNESS
[FreeTextEntry1] : HISTORY OF PRESENT ILLNESS, ORIGINAL PRESENTATION: Mr. Ramirez is a 62-year-old gentleman who presents with a 20-year history of low back pain. He was seen by Neurosurgery in 2013. They had recommended possible surgical intervention; however, at that time, he was not interested. As mentioned above, his pain radiates down the right leg to the foot with paresthesias and dysesthesias. He describes it as moderate-to-severe, nearly constant, 60% to 95% percent of the time. No particular pattern. He describes the pain as shooting, sharp, cutting with numbness in the lower extremities. He draws a corresponding pain diagram. He states that standing, sitting, walking, exercise, coughing, sneezing all increase his pain. Lying down and relaxing decrease his pain. He denies any recent change in bowel or bladder habits.\par \par ACTIVITIES: He states he can walk several blocks depending on how severe his pain is. Depending on how severe pain is, he does not know how long he could sit or stand for. In a day, he often lies down because of the pain. He does not use an assistive device to ambulate. Because of his pain, he has difficulty going to work, \par \par TODAY: I had the pleasure of seeing Mr. Ramirez today in follow up.  His previous history and physical findings have been reviewed.\par \par He is under our care for chronic lumbar pain with radiculopathy which he is receiving continuing active treatment for. His right leg pain has been relatively stable over the past few weeks. He has trialed PT and injections in the past, which were ineffective for him. He does not wish to re-trial them at this time.\par \par He remains stable on a regimen of ibuprofen twice a day PRN, Percocet 7.5/325 mg twice daily as needed as well as amitriptyline nightly. He was advised to increase his amitriptyline to 2 tabs at nighttime, which he did not do. He states the combination of medications help him function during the day and sleep better at night. He denies side effects.  \par \par Of note, patient's glucose has decreased with a medication adjustment. He is having problems with his prostate now. His PSA is elevated and he was referred for a CT scan of the prostate which he will be undergoing in 2 days.\par \par UDS: 1/12/23 - Consistent.\par SOAPP 1/12/23 - HIGH  RISK.\par \par

## 2023-04-04 NOTE — ASSESSMENT
[FreeTextEntry1] : 62 year old male with chronic lumbar pain with radiculopathy. He will continue with ibuprofen twice a day PRN, Percocet 7.5/325 mg twice daily as needed as well as amitriptyline nightly. He will f/u in 4 weeks for reevaluation. UDS will be repeated on next visit.\par

## 2023-04-04 NOTE — DISCUSSION/SUMMARY
[de-identified] : I have consulted the  registry for the purpose of reviewing the patient's controlled substance.\par \par Overall there is at least a 30-50% reduction in pain with the prescribed analgesics. The patient denies any adverse side effects due to the medication (sleeping disturbance, constipation, sleepiness, hallucinations and/or urination problems). \par There are no inconsistencies on urine toxicology screening which would necessitate an alteration of therapy.\par The patient will return to the office in 4 weeks time and is aware to contact me with any question or concerns in the interim.\par \par Silvia Craven PA-C\par Chapincito Drummond DO\par

## 2023-05-02 ENCOUNTER — APPOINTMENT (OUTPATIENT)
Dept: PAIN MANAGEMENT | Facility: CLINIC | Age: 63
End: 2023-05-02
Payer: MEDICARE

## 2023-05-02 ENCOUNTER — LABORATORY RESULT (OUTPATIENT)
Age: 63
End: 2023-05-02

## 2023-05-02 VITALS — WEIGHT: 185 LBS | HEIGHT: 71 IN | BODY MASS INDEX: 25.9 KG/M2

## 2023-05-02 VITALS
BODY MASS INDEX: 25.9 KG/M2 | DIASTOLIC BLOOD PRESSURE: 76 MMHG | HEIGHT: 71 IN | HEART RATE: 78 BPM | SYSTOLIC BLOOD PRESSURE: 120 MMHG | WEIGHT: 185 LBS

## 2023-05-02 LAB
AMP / AMPHETAMINE: NEGATIVE
BAR / SECOBARBITAL: NEGATIVE
BUP / BUPRENORPHINE: NEGATIVE
BZO / OXAZEPAM: NEGATIVE
COC / COCAINE: NEGATIVE
CREATININE: 50 MG/DL
MDMA / METHYLENEDIOXYMETHAMPHETAMINE: NEGATIVE
MET / METHAMPHETAMINES: NEGATIVE
MOP / MORPHINE: NEGATIVE
MTD / METHADONE: NEGATIVE
OXY / OXYCODONE: POSITIVE
PCP / PHENCYCLIDINE: NEGATIVE
PH: 5
SPECIFIC GRAVITY: 1.02
TEMPERATURE: 90 C
THC / MARIJUANA: POSITIVE

## 2023-05-02 PROCEDURE — 99213 OFFICE O/P EST LOW 20 MIN: CPT

## 2023-05-02 PROCEDURE — 80305 DRUG TEST PRSMV DIR OPT OBS: CPT | Mod: QW

## 2023-05-02 NOTE — HISTORY OF PRESENT ILLNESS
[FreeTextEntry1] : HISTORY OF PRESENT ILLNESS, ORIGINAL PRESENTATION: Mr. Ramirez is a 62-year-old gentleman who presents with a 20-year history of low back pain. He was seen by Neurosurgery in 2013. They had recommended possible surgical intervention; however, at that time, he was not interested. As mentioned above, his pain radiates down the right leg to the foot with paresthesias and dysesthesias. He describes it as moderate-to-severe, nearly constant, 60% to 95% percent of the time. No particular pattern. He describes the pain as shooting, sharp, cutting with numbness in the lower extremities. He draws a corresponding pain diagram. He states that standing, sitting, walking, exercise, coughing, sneezing all increase his pain. Lying down and relaxing decrease his pain. He denies any recent change in bowel or bladder habits.\par \par ACTIVITIES: He states he can walk several blocks depending on how severe his pain is. Depending on how severe pain is, he does not know how long he could sit or stand for. In a day, he often lies down because of the pain. He does not use an assistive device to ambulate. Because of his pain, he has difficulty going to work, \par \par TODAY: I had the pleasure of seeing Mr. Ramirez today in follow up.  His previous history and physical findings have been reviewed.\par \par He is under our care for chronic lumbar pain with radiculopathy which he is receiving continuing active treatment for. He has trialed PT and injections in the past, which were ineffective for him. He does not wish to re-trial them at this time.\par \par He remains stable on a regimen of ibuprofen twice a day PRN, Percocet 7.5/325 mg twice daily as needed as well as amitriptyline nightly. He states the combination of medications help him function during the day and sleep better at night. He denies side effects.  \par \par He has been having an issue with his prostate. His PSA is elevated and he was referred for a CT scan of the prostate. Unfortunately, the CT scan was cancelled. He is now having a biopsy done next week instead.\par \par UDS: repeated today, 5/2/23 - see separate note.\par SOAPP 1/12/23 - HIGH  RISK.\par \par

## 2023-05-02 NOTE — DISCUSSION/SUMMARY
[de-identified] : I have consulted the  registry for the purpose of reviewing the patient's controlled substance.\par \par Overall there is at least a 30-50% reduction in pain with the prescribed analgesics. The patient denies any adverse side effects due to the medication (sleeping disturbance, constipation, sleepiness, hallucinations and/or urination problems). \par Urine drug screening collected today with rapid sample result consistent with given regimen. Sample to be sent for confirmatory testing.\par The patient will return to the office in 4 weeks time and is aware to contact me with any question or concerns in the interim.\par \par Silvia Craven PA-C\par Chapincito Drummond DO\par

## 2023-05-02 NOTE — ASSESSMENT
[FreeTextEntry1] : 62 year old male with chronic lumbar pain with radiculopathy. He will continue with ibuprofen twice a day PRN, Percocet 7.5/325 mg twice daily as needed as well as amitriptyline nightly. He will f/u in 4 weeks for reevaluation.

## 2023-05-05 LAB

## 2023-05-12 ENCOUNTER — APPOINTMENT (OUTPATIENT)
Dept: UROLOGY | Facility: CLINIC | Age: 63
End: 2023-05-12

## 2023-06-01 ENCOUNTER — APPOINTMENT (OUTPATIENT)
Dept: PAIN MANAGEMENT | Facility: CLINIC | Age: 63
End: 2023-06-01
Payer: MEDICARE

## 2023-06-01 VITALS
DIASTOLIC BLOOD PRESSURE: 68 MMHG | HEART RATE: 90 BPM | WEIGHT: 185 LBS | HEIGHT: 71 IN | SYSTOLIC BLOOD PRESSURE: 115 MMHG | BODY MASS INDEX: 25.9 KG/M2

## 2023-06-01 PROCEDURE — 99213 OFFICE O/P EST LOW 20 MIN: CPT

## 2023-06-01 NOTE — DISCUSSION/SUMMARY
[de-identified] : I have consulted the  registry for the purpose of reviewing the patient's controlled substance.\par \par Overall there is at least a 30-50% reduction in pain with the prescribed analgesics. The patient denies any adverse side effects due to the medication (sleeping disturbance, constipation, sleepiness, hallucinations and/or urination problems). \par There are no inconsistencies on urine toxicology screening which would necessitate an alteration of therapy.\par The patient will return to the office in 4 weeks time and is aware to contact me with any question or concerns in the interim.\par \par Silvia Craven PA-C\par Chapincito Drummond DO\par

## 2023-06-01 NOTE — HISTORY OF PRESENT ILLNESS
[FreeTextEntry1] : HISTORY OF PRESENT ILLNESS, ORIGINAL PRESENTATION: Mr. Ramirez is a 62-year-old gentleman who presents with a 20-year history of low back pain. He was seen by Neurosurgery in 2013. They had recommended possible surgical intervention; however, at that time, he was not interested. As mentioned above, his pain radiates down the right leg to the foot with paresthesias and dysesthesias. He describes it as moderate-to-severe, nearly constant, 60% to 95% percent of the time. No particular pattern. He describes the pain as shooting, sharp, cutting with numbness in the lower extremities. He draws a corresponding pain diagram. He states that standing, sitting, walking, exercise, coughing, sneezing all increase his pain. Lying down and relaxing decrease his pain. He denies any recent change in bowel or bladder habits.\par \par ACTIVITIES: He states he can walk several blocks depending on how severe his pain is. Depending on how severe pain is, he does not know how long he could sit or stand for. In a day, he often lies down because of the pain. He does not use an assistive device to ambulate. Because of his pain, he has difficulty going to work, \par \par TODAY: I had the pleasure of seeing Mr. Ramirez today in follow up.  His previous history and physical findings have been reviewed.\par \par He is under our care for chronic lumbar pain with radiculopathy which he is receiving continuing active treatment for. He has trialed PT and injections in the past, which were ineffective for him. He does not wish to re-trial them at this time.\par \par He remains stable on a regimen of ibuprofen twice a day PRN, Percocet 7.5/325 mg twice daily as needed as well as amitriptyline nightly. He states the combination of medications help him function during the day and sleep better at night. He denies side effects.  \par \par He has been diagnosed with prostate CA. He is scheduled for a PET scan in the near future.\par \par UDS: 5/2/23 - Consistent.\par SOAPP 1/12/23 - HIGH  RISK.\par \par

## 2023-06-29 ENCOUNTER — APPOINTMENT (OUTPATIENT)
Dept: PAIN MANAGEMENT | Facility: CLINIC | Age: 63
End: 2023-06-29
Payer: MEDICARE

## 2023-06-29 VITALS
WEIGHT: 185 LBS | DIASTOLIC BLOOD PRESSURE: 73 MMHG | HEIGHT: 71 IN | SYSTOLIC BLOOD PRESSURE: 107 MMHG | HEART RATE: 77 BPM | BODY MASS INDEX: 25.9 KG/M2

## 2023-06-29 PROCEDURE — 99213 OFFICE O/P EST LOW 20 MIN: CPT

## 2023-06-29 NOTE — ASSESSMENT
[FreeTextEntry1] : 63 year old male with chronic lumbar pain with radiculopathy. He will continue with ibuprofen twice a day PRN, Percocet 7.5/325 mg twice daily as needed as well as amitriptyline nightly. He will f/u in 4 weeks for reevaluation.

## 2023-06-29 NOTE — HISTORY OF PRESENT ILLNESS
[FreeTextEntry1] : HISTORY OF PRESENT ILLNESS, ORIGINAL PRESENTATION: Mr. Ramirez is a 63-year-old gentleman who presents with a 20-year history of low back pain. He was seen by Neurosurgery in 2013. They had recommended possible surgical intervention; however, at that time, he was not interested. As mentioned above, his pain radiates down the right leg to the foot with paresthesias and dysesthesias. He describes it as moderate-to-severe, nearly constant, 60% to 95% percent of the time. No particular pattern. He describes the pain as shooting, sharp, cutting with numbness in the lower extremities. He draws a corresponding pain diagram. He states that standing, sitting, walking, exercise, coughing, sneezing all increase his pain. Lying down and relaxing decrease his pain. He denies any recent change in bowel or bladder habits.\par \par ACTIVITIES: He states he can walk several blocks depending on how severe his pain is. Depending on how severe pain is, he does not know how long he could sit or stand for. In a day, he often lies down because of the pain. He does not use an assistive device to ambulate. Because of his pain, he has difficulty going to work, \par \par TODAY: I had the pleasure of seeing Mr. Ramirez today in follow up.  His previous history and physical findings have been reviewed.\par \par He is under our care for chronic lumbar pain with radiculopathy which he is receiving continuing active treatment for. He has trialed PT and injections in the past, which were ineffective for him. He does not wish to re-trial them at this time.\par \par He remains stable on a regimen of ibuprofen twice a day PRN, Percocet 7.5/325 mg twice daily as needed as well as amitriptyline nightly. He states the combination of medications help him function during the day and sleep better at night. He denies side effects.  \par \par He has been diagnosed with prostate CA. He is scheduled for a PET scan for tomorrow.\par \par UDS: 5/2/23 - Consistent.\par SOAPP 1/12/23 - HIGH  RISK.\par \par

## 2023-06-29 NOTE — DISCUSSION/SUMMARY
[de-identified] : I have consulted the  registry for the purpose of reviewing the patient's controlled substance.\par \par Overall there is at least a 30-50% reduction in pain with the prescribed analgesics. The patient denies any adverse side effects due to the medication (sleeping disturbance, constipation, sleepiness, hallucinations and/or urination problems). \par There are no inconsistencies on urine toxicology screening which would necessitate an alteration of therapy.\par The patient will return to the office in 4 weeks time and is aware to contact me with any question or concerns in the interim.\par \par Silvia Craven PA-C\par Chapincito Drummond DO\par

## 2023-08-01 ENCOUNTER — APPOINTMENT (OUTPATIENT)
Dept: PAIN MANAGEMENT | Facility: CLINIC | Age: 63
End: 2023-08-01
Payer: MEDICARE

## 2023-08-01 VITALS
DIASTOLIC BLOOD PRESSURE: 76 MMHG | HEART RATE: 73 BPM | WEIGHT: 164 LBS | SYSTOLIC BLOOD PRESSURE: 112 MMHG | HEIGHT: 71 IN | BODY MASS INDEX: 22.96 KG/M2

## 2023-08-01 PROCEDURE — 99213 OFFICE O/P EST LOW 20 MIN: CPT

## 2023-08-01 NOTE — HISTORY OF PRESENT ILLNESS
[FreeTextEntry1] : HISTORY OF PRESENT ILLNESS, ORIGINAL PRESENTATION: Mr. Ramirez is a 63-year-old gentleman who presents with a 20-year history of low back pain. He was seen by Neurosurgery in 2013. They had recommended possible surgical intervention; however, at that time, he was not interested. As mentioned above, his pain radiates down the right leg to the foot with paresthesias and dysesthesias. He describes it as moderate-to-severe, nearly constant, 60% to 95% percent of the time. No particular pattern. He describes the pain as shooting, sharp, cutting with numbness in the lower extremities. He draws a corresponding pain diagram. He states that standing, sitting, walking, exercise, coughing, sneezing all increase his pain. Lying down and relaxing decrease his pain. He denies any recent change in bowel or bladder habits.  ACTIVITIES: He states he can walk several blocks depending on how severe his pain is. Depending on how severe pain is, he does not know how long he could sit or stand for. In a day, he often lies down because of the pain. He does not use an assistive device to ambulate. Because of his pain, he has difficulty going to work,   TODAY: I had the pleasure of seeing Mr. Ramirez today in follow up.  His previous history and physical findings have been reviewed. He is under our care for chronic lumbar pain with radiculopathy which he is receiving continuing active treatment for. He has trialed PT and injections in the past, which were ineffective for him. He does not wish to re-trial them at this time.  He remains stable on a regimen of ibuprofen twice a day PRN, Percocet 7.5/325 mg twice daily as needed as well as amitriptyline nightly. He states the combination of medications help him function during the day and sleep better at night. He denies side effects.    He has been diagnosed with prostate CA. As per patient, his PET scan was negative. Patient will be undergoing radiation treatment in the near future.  UDS: 5/2/23 - Consistent. SOAPP 1/12/23 - HIGH  RISK.

## 2023-08-01 NOTE — DISCUSSION/SUMMARY
[de-identified] : I have consulted the  registry for the purpose of reviewing the patient's controlled substance.\par  \par  Overall there is at least a 30-50% reduction in pain with the prescribed analgesics. The patient denies any adverse side effects due to the medication (sleeping disturbance, constipation, sleepiness, hallucinations and/or urination problems). \par  There are no inconsistencies on urine toxicology screening which would necessitate an alteration of therapy.\par  The patient will return to the office in 4 weeks time and is aware to contact me with any question or concerns in the interim.\par  \par  Silvia Craven PA-C\par  Chapincito Drummond DO\par

## 2023-09-12 ENCOUNTER — RESULT CHARGE (OUTPATIENT)
Age: 63
End: 2023-09-12

## 2023-09-12 ENCOUNTER — LABORATORY RESULT (OUTPATIENT)
Age: 63
End: 2023-09-12

## 2023-09-12 ENCOUNTER — APPOINTMENT (OUTPATIENT)
Dept: PAIN MANAGEMENT | Facility: CLINIC | Age: 63
End: 2023-09-12
Payer: MEDICARE

## 2023-09-12 VITALS
SYSTOLIC BLOOD PRESSURE: 115 MMHG | DIASTOLIC BLOOD PRESSURE: 78 MMHG | WEIGHT: 164 LBS | HEIGHT: 71 IN | BODY MASS INDEX: 22.96 KG/M2 | HEART RATE: 71 BPM

## 2023-09-12 LAB
AMP / AMPHETAMINE: NEGATIVE
BAR / SECOBARBITAL: NEGATIVE
BUP / BUPRENORPHINE: NEGATIVE
BZO / OXAZEPAM: NEGATIVE
COC / COCAINE: NEGATIVE
MDMA / METHYLENEDIOXYMETHAMPHETAMINE: NEGATIVE
MET / METHAMPHETAMINES: NEGATIVE
MOP / MORPHINE: NEGATIVE
MTD / METHADONE: NEGATIVE
OXY / OXYCODONE: POSITIVE
PCP / PHENCYCLIDINE: NEGATIVE
TEMPERATURE: 90 F
THC / MARIJUANA: POSITIVE

## 2023-09-12 PROCEDURE — 80305 DRUG TEST PRSMV DIR OPT OBS: CPT | Mod: QW

## 2023-09-12 PROCEDURE — 99214 OFFICE O/P EST MOD 30 MIN: CPT

## 2023-09-19 LAB
PM 6 MAM: NEGATIVE NG/ML
PM 7-AMINO-CLONAZ: NEGATIVE NG/ML
PM ALPHA-HYDROXY-ALPRAZOLAM: NEGATIVE NG/ML
PM ALPHA-HYDROXY-MIDAZOLAM: NEGATIVE NG/ML
PM ALPRAZOLAM: NEGATIVE NG/ML
PM AMOBARBITAL: NEGATIVE NG/ML
PM AMPHETAMINE INTERP: NEGATIVE
PM AMPHETAMINE: NEGATIVE NG/ML
PM BARBURATES INTERP: NEGATIVE
PM BEG: NEGATIVE NG/ML
PM BENZODIAZEPINES INTERP: NEGATIVE
PM BUPRENORPHINE INTERP: NEGATIVE
PM BUPRENORPHINE: NEGATIVE NG/ML
PM BUTALBITAL: NEGATIVE NG/ML
PM CLONAZEPAM: NEGATIVE NG/ML
PM COCAINE INTERP: NEGATIVE
PM COCAINE: NEGATIVE NG/ML
PM CODIENE: NEGATIVE NG/ML
PM COTININE: 954 NG/ML
PM DIAZEPAM: NEGATIVE NG/ML
PM DIHYROCODEINE: NEGATIVE NG/ML
PM EDDP: NEGATIVE NG/ML
PM FENTANYL INTERP: NEGATIVE
PM FENTANYL: NEGATIVE NG/ML
PM FLUNITRAZEPAM: NEGATIVE NG/ML
PM FLURAZEPAM: NEGATIVE NG/ML
PM HYDROCODONE: NEGATIVE NG/ML
PM HYDROMORPHONE: NEGATIVE NG/ML
PM LORAZEPAM: NEGATIVE NG/ML
PM MARIJUANA (DELTA-9-THC): NEGATIVE NG/ML
PM MARIJUANA INTERP: NEGATIVE
PM MDA: NEGATIVE NG/ML
PM MDEA: NEGATIVE NG/ML
PM MDMA: NEGATIVE NG/ML
PM MEPERIDINE: NEGATIVE NG/ML
PM METHADONE INTERP: NEGATIVE
PM METHADONE: NEGATIVE NG/ML
PM METHAMPHETAMINE: NEGATIVE NG/ML
PM MIDAZOLAM: NEGATIVE NG/ML
PM MORPHINE: NEGATIVE NG/ML
PM NALOXONE: NEGATIVE NG/ML
PM NALTREXONE: NEGATIVE NG/ML
PM NICOTINE INTERP: POSITIVE
PM NORBUPRENORPHINE: NEGATIVE NG/ML
PM NORDIAZEPAM: NEGATIVE NG/ML
PM NORMEPERIDINE: NEGATIVE NG/ML
PM NOROXYCODONE: >1000 NG/ML
PM OPIOID INTERP: NEGATIVE
PM OXAZEPAM: NEGATIVE NG/ML
PM OXXYCODONE INTERP: POSITIVE
PM OXYCODONE: >1000 NG/ML
PM OXYMORPHONE: 190 NG/ML
PM PCP: NEGATIVE NG/ML
PM PHENCYCLIDINE INTERP: NEGATIVE
PM PHENOBARBITAL: NEGATIVE NG/ML
PM PPX: NEGATIVE NG/ML
PM PROPOXYPHENE INTERP: NEGATIVE
PM SECOBARBITAL: NEGATIVE NG/ML
PM SUFENTANIL: NEGATIVE NG/ML
PM TAPENTADOL: NEGATIVE NG/ML
PM TEMAZEPAM: NEGATIVE NG/ML
PM TRAMADOL INTERP: NEGATIVE
PM TRAMADOL: NEGATIVE NG/ML

## 2023-10-05 ENCOUNTER — RX RENEWAL (OUTPATIENT)
Age: 63
End: 2023-10-05

## 2023-10-10 ENCOUNTER — APPOINTMENT (OUTPATIENT)
Dept: PAIN MANAGEMENT | Facility: CLINIC | Age: 63
End: 2023-10-10
Payer: MEDICARE

## 2023-10-10 VITALS
DIASTOLIC BLOOD PRESSURE: 79 MMHG | WEIGHT: 164 LBS | HEART RATE: 82 BPM | BODY MASS INDEX: 22.96 KG/M2 | HEIGHT: 71 IN | SYSTOLIC BLOOD PRESSURE: 124 MMHG

## 2023-10-10 PROCEDURE — 99214 OFFICE O/P EST MOD 30 MIN: CPT

## 2023-11-09 ENCOUNTER — APPOINTMENT (OUTPATIENT)
Dept: PAIN MANAGEMENT | Facility: CLINIC | Age: 63
End: 2023-11-09
Payer: MEDICARE

## 2023-11-09 PROCEDURE — 99442: CPT

## 2023-11-09 RX ORDER — AMITRIPTYLINE HYDROCHLORIDE 50 MG/1
50 TABLET, FILM COATED ORAL
Qty: 60 | Refills: 2 | Status: DISCONTINUED | COMMUNITY
Start: 2022-05-20 | End: 2023-11-09

## 2023-12-12 ENCOUNTER — APPOINTMENT (OUTPATIENT)
Dept: PAIN MANAGEMENT | Facility: CLINIC | Age: 63
End: 2023-12-12
Payer: MEDICARE

## 2023-12-12 VITALS — BODY MASS INDEX: 44.1 KG/M2 | WEIGHT: 315 LBS | HEIGHT: 71 IN

## 2023-12-12 PROCEDURE — 99214 OFFICE O/P EST MOD 30 MIN: CPT

## 2023-12-12 RX ORDER — NORTRIPTYLINE HYDROCHLORIDE 25 MG/1
25 CAPSULE ORAL
Qty: 60 | Refills: 1 | Status: ACTIVE | COMMUNITY
Start: 2023-10-10 | End: 1900-01-01

## 2023-12-12 RX ORDER — IBUPROFEN 800 MG/1
800 TABLET, FILM COATED ORAL
Qty: 90 | Refills: 0 | Status: ACTIVE | COMMUNITY
Start: 2021-10-25 | End: 1900-01-01

## 2024-01-09 ENCOUNTER — LABORATORY RESULT (OUTPATIENT)
Age: 64
End: 2024-01-09

## 2024-01-09 ENCOUNTER — APPOINTMENT (OUTPATIENT)
Dept: PAIN MANAGEMENT | Facility: CLINIC | Age: 64
End: 2024-01-09
Payer: MEDICARE

## 2024-01-09 VITALS — HEIGHT: 71 IN | BODY MASS INDEX: 24.5 KG/M2 | WEIGHT: 175 LBS

## 2024-01-09 DIAGNOSIS — G89.4 CHRONIC PAIN SYNDROME: ICD-10-CM

## 2024-01-09 DIAGNOSIS — Z79.899 OTHER LONG TERM (CURRENT) DRUG THERAPY: ICD-10-CM

## 2024-01-09 DIAGNOSIS — C61 MALIGNANT NEOPLASM OF PROSTATE: ICD-10-CM

## 2024-01-09 DIAGNOSIS — M54.16 RADICULOPATHY, LUMBAR REGION: ICD-10-CM

## 2024-01-09 DIAGNOSIS — M47.817 SPONDYLOSIS W/OUT MYELOPATHY OR RADICULOPATHY, LUMBOSACRAL REGION: ICD-10-CM

## 2024-01-09 PROCEDURE — 99214 OFFICE O/P EST MOD 30 MIN: CPT

## 2024-01-09 PROCEDURE — 80305 DRUG TEST PRSMV DIR OPT OBS: CPT | Mod: QW

## 2024-01-09 RX ORDER — OXYCODONE AND ACETAMINOPHEN 7.5; 325 MG/1; MG/1
7.5-325 TABLET ORAL
Qty: 60 | Refills: 0 | Status: ACTIVE | COMMUNITY
Start: 2022-05-20 | End: 1900-01-01

## 2024-01-12 LAB
PM 6 MAM: NEGATIVE NG/ML
PM 7-AMINO-CLONAZ: NEGATIVE NG/ML
PM ALPHA-HYDROXY-ALPRAZOLAM: NEGATIVE NG/ML
PM ALPHA-HYDROXY-MIDAZOLAM: NEGATIVE NG/ML
PM ALPRAZOLAM: NEGATIVE NG/ML
PM AMOBARBITAL: NEGATIVE NG/ML
PM AMPHETAMINE INTERP: NEGATIVE
PM AMPHETAMINE: NEGATIVE NG/ML
PM BARBURATES INTERP: NEGATIVE
PM BEG: 252 NG/ML
PM BENZODIAZEPINES INTERP: POSITIVE
PM BUPRENORPHINE INTERP: NEGATIVE
PM BUPRENORPHINE: NEGATIVE NG/ML
PM BUTALBITAL: NEGATIVE NG/ML
PM CLONAZEPAM: NEGATIVE NG/ML
PM COCAINE INTERP: POSITIVE
PM COCAINE: 55 NG/ML
PM CODIENE: NEGATIVE NG/ML
PM COTININE: >1000 NG/ML
PM DIAZEPAM: NEGATIVE NG/ML
PM DIHYROCODEINE: NEGATIVE NG/ML
PM EDDP: NEGATIVE NG/ML
PM FENTANYL INTERP: NEGATIVE
PM FENTANYL: NEGATIVE NG/ML
PM FLUNITRAZEPAM: NEGATIVE NG/ML
PM FLURAZEPAM: NEGATIVE NG/ML
PM HYDROCODONE: NEGATIVE NG/ML
PM HYDROMORPHONE: NEGATIVE NG/ML
PM LORAZEPAM: NEGATIVE NG/ML
PM MARIJUANA (DELTA-9-THC): NEGATIVE NG/ML
PM MARIJUANA INTERP: NEGATIVE
PM MDA: NEGATIVE NG/ML
PM MDEA: NEGATIVE NG/ML
PM MDMA: NEGATIVE NG/ML
PM MEPERIDINE: NEGATIVE NG/ML
PM METHADONE INTERP: POSITIVE
PM METHADONE: NEGATIVE NG/ML
PM METHAMPHETAMINE: NEGATIVE NG/ML
PM MIDAZOLAM: NEGATIVE NG/ML
PM MORPHINE: NEGATIVE NG/ML
PM NALOXONE: NEGATIVE NG/ML
PM NALTREXONE: NEGATIVE NG/ML
PM NICOTINE INTERP: POSITIVE
PM NORBUPRENORPHINE: NEGATIVE NG/ML
PM NORDIAZEPAM: NEGATIVE NG/ML
PM NORFENTANYL: NEGATIVE NG/ML
PM NORMEPERIDINE: NEGATIVE NG/ML
PM NOROXYCODONE: >1000 NG/ML
PM OPIOID INTERP: NEGATIVE
PM OXAZEPAM: NEGATIVE NG/ML
PM OXXYCODONE INTERP: POSITIVE
PM OXYCODONE: >1000 NG/ML
PM OXYMORPHONE: 220 NG/ML
PM PCP: NEGATIVE NG/ML
PM PHENCYCLIDINE INTERP: NEGATIVE
PM PHENOBARBITAL: NEGATIVE NG/ML
PM PPX: NEGATIVE NG/ML
PM PROPOXYPHENE INTERP: NEGATIVE
PM SECOBARBITAL: NEGATIVE NG/ML
PM SUFENTANIL: NEGATIVE NG/ML
PM TAPENTADOL: NEGATIVE NG/ML
PM TEMAZEPAM: NEGATIVE NG/ML
PM TRAMADOL INTERP: NEGATIVE
PM TRAMADOL: NEGATIVE NG/ML

## 2024-02-05 ENCOUNTER — APPOINTMENT (OUTPATIENT)
Dept: PAIN MANAGEMENT | Facility: CLINIC | Age: 64
End: 2024-02-05

## 2024-06-05 ENCOUNTER — NON-APPOINTMENT (OUTPATIENT)
Age: 64
End: 2024-06-05

## 2024-10-11 ENCOUNTER — NON-APPOINTMENT (OUTPATIENT)
Age: 64
End: 2024-10-11